# Patient Record
(demographics unavailable — no encounter records)

---

## 2024-10-18 NOTE — HISTORY OF PRESENT ILLNESS
[FreeTextEntry1] : Follow-up for BPH, on Flomax and Proscar once daily, patient reports persist symptom (weak urine stream). PVR 0ml today Follow-up for elevated PSA, MRI was done, patient return to review result, PSA 5.96 in Sept 2024   Please refer to URO Consult Note.

## 2024-10-18 NOTE — LETTER BODY
[FreeTextEntry1] : Ozzy Vegas MD [address] [id number] [phone]  Dear Dr. Vegas,   Reason for Visit: BPH. Elevated PSA.  This is a 59 year-old male with elevated PSA and symptoms of BPH. Patient reports has had a previous negative prostate biopsy back in 2021. Patient is here today for follow-up. Since his last visit, patient obtained a prostate MRI which demonstrated a clinically significant lesion, PI-RADS 4. His recent PSA was 5.96 which has increased.  Patient reports taking Flomax and Proscar regularly with no side effects or difficulties. He reports no improvement to his urinary symptoms despite medication. He denies any hematuria or urinary incontinence. His symptoms are aggravated by hydration. He denies any alleviating factors.  He reports no pain. All other review of systems are negative. He has no cancer in his family medical history. He has no previous surgical history. Past medical history, family history and social history were inquired and were noncontributory to current condition. The patient does not use tobacco or drink alcohol. Medications and allergies were reviewed. He has no known allergies to medication.  On examination, the patient is a well -appearing male in no acute distress. He is alert and oriented follows commands. He has normal mood and affect. He is normocephalic. Neck is supple. Oral no thrush Respirations are unlabored. His abdomen is soft and nontender. Bladder is nonpalpable. No CVA tenderness. Neurologically he is grossly intact. No peripheral edema. Skin without gross abnormality. He has normal male external genitalia. Normal meatus. Bilateral testes are descended intrascrotally and normal to palpation.    His PSA is 5.96, which is elevated.      I personally reviewed ultrasound images with the patient today and images demonstrated a PI-RADS 4 MRI lesion, high chance for clinically significant prostate cancer.     ASSESSMENT: BPH. Elevated PSA.  I counseled the patient. In terms of his BPH, patient reports no improvement to his urinary symptoms despite taking Flomax and Proscar. I recommended the patient undergo cystoscopy and UDS to evaluate the urinary outlet. In terms of his elevated PSA, his recent prostate MRI demonstrated a clinically significant PI-RADS 4 prostate lesion. I discussed with him the risk of occult malignancy. He will under fusion targeted prostate biopsy to rule out prostate cancer. I counseled the patient regarding the procedures. The risks and benefits were discussed. Alternatives were given. I answered the patient's questions. The patient will take the necessary preparations for the procedures, including fleet enema. The patient will follow-up as directed and will contact me with any questions or concerns. Thank you for the opportunity to participate in the care of Mr. LAWTON. I will keep you updated on his progress.  Plan: Cystoscopy UDS. Fusion targeted prostate biopsy. Fleet enema. Follow up as directed.